# Patient Record
Sex: FEMALE | Race: BLACK OR AFRICAN AMERICAN | NOT HISPANIC OR LATINO | Employment: UNEMPLOYED | URBAN - METROPOLITAN AREA
[De-identification: names, ages, dates, MRNs, and addresses within clinical notes are randomized per-mention and may not be internally consistent; named-entity substitution may affect disease eponyms.]

---

## 2022-12-30 ENCOUNTER — OFFICE VISIT (OUTPATIENT)
Dept: OBSTETRICS AND GYNECOLOGY | Facility: CLINIC | Age: 22
End: 2022-12-30
Payer: MEDICAID

## 2022-12-30 VITALS
SYSTOLIC BLOOD PRESSURE: 127 MMHG | HEART RATE: 86 BPM | DIASTOLIC BLOOD PRESSURE: 58 MMHG | HEIGHT: 62 IN | BODY MASS INDEX: 26.87 KG/M2 | WEIGHT: 146 LBS

## 2022-12-30 DIAGNOSIS — Z64.1 GRAND MULTIPARA: ICD-10-CM

## 2022-12-30 DIAGNOSIS — Z32.01 POSITIVE PREGNANCY TEST: Primary | ICD-10-CM

## 2022-12-30 PROCEDURE — 1159F PR MEDICATION LIST DOCUMENTED IN MEDICAL RECORD: ICD-10-PCS | Mod: CPTII,S$GLB,, | Performed by: ADVANCED PRACTICE MIDWIFE

## 2022-12-30 PROCEDURE — 3078F PR MOST RECENT DIASTOLIC BLOOD PRESSURE < 80 MM HG: ICD-10-PCS | Mod: CPTII,S$GLB,, | Performed by: ADVANCED PRACTICE MIDWIFE

## 2022-12-30 PROCEDURE — 99203 PR OFFICE/OUTPT VISIT, NEW, LEVL III, 30-44 MIN: ICD-10-PCS | Mod: TH,S$GLB,, | Performed by: ADVANCED PRACTICE MIDWIFE

## 2022-12-30 PROCEDURE — 1159F MED LIST DOCD IN RCRD: CPT | Mod: CPTII,S$GLB,, | Performed by: ADVANCED PRACTICE MIDWIFE

## 2022-12-30 PROCEDURE — 3008F PR BODY MASS INDEX (BMI) DOCUMENTED: ICD-10-PCS | Mod: CPTII,S$GLB,, | Performed by: ADVANCED PRACTICE MIDWIFE

## 2022-12-30 PROCEDURE — 3074F PR MOST RECENT SYSTOLIC BLOOD PRESSURE < 130 MM HG: ICD-10-PCS | Mod: CPTII,S$GLB,, | Performed by: ADVANCED PRACTICE MIDWIFE

## 2022-12-30 PROCEDURE — 3074F SYST BP LT 130 MM HG: CPT | Mod: CPTII,S$GLB,, | Performed by: ADVANCED PRACTICE MIDWIFE

## 2022-12-30 PROCEDURE — 3008F BODY MASS INDEX DOCD: CPT | Mod: CPTII,S$GLB,, | Performed by: ADVANCED PRACTICE MIDWIFE

## 2022-12-30 PROCEDURE — 3078F DIAST BP <80 MM HG: CPT | Mod: CPTII,S$GLB,, | Performed by: ADVANCED PRACTICE MIDWIFE

## 2022-12-30 PROCEDURE — 99203 OFFICE O/P NEW LOW 30 MIN: CPT | Mod: TH,S$GLB,, | Performed by: ADVANCED PRACTICE MIDWIFE

## 2022-12-30 RX ORDER — CEFDINIR 300 MG/1
300 CAPSULE ORAL 2 TIMES DAILY
COMMUNITY
Start: 2022-12-18

## 2022-12-30 NOTE — PROGRESS NOTES
"CC: Absence of menses    Walter Ochoa is a 22 y.o. female  presents with complaint of absence of menstruation.   LMP of 22 = EDC of 23. Now 8 + 2/7 weeks. Here to establish OB care. Doing well.    UPT is positive. No lof/brvb, dysuria, fever/chills or abdominal pain.     OBHX:  Grand Multip   x 4 at term. Proven to # 7 lbs  PPROM PTL/PTD at ? 35 weeks with last pregnancy, delivery in [place] (2021).   Desires BTL    History reviewed. No pertinent past medical history.  History reviewed. No pertinent surgical history.  Social History     Socioeconomic History    Marital status: Single   Tobacco Use    Smoking status: Never     Passive exposure: Never    Smokeless tobacco: Never   Substance and Sexual Activity    Alcohol use: Not Currently    Drug use: Never    Sexual activity: Yes     Partners: Male     Birth control/protection: None     History reviewed. No pertinent family history.  OB History    Para Term  AB Living   6       0 5   SAB IAB Ectopic Multiple Live Births   0       5      # Outcome Date GA Lbr Deonte/2nd Weight Sex Delivery Anes PTL Lv   6 Current            5  21    M Vag-Spont None N CAROL   4  19    M Vag-Spont None N CAROL   3  18    M  EPI N CAROL   2  17    M Vag-Spont EPI N CAROL   1  12    M Vag-Spont EPI N CAROL       BP (!) 127/58   Pulse 86   Ht 5' 2" (1.575 m)   Wt 66.2 kg (146 lb)   LMP 2022 (Exact Date)   BMI 26.70 kg/m²     ROS:  GENERAL: Denies weight gain or weight loss. Feeling well overall.   SKIN: Denies rash or lesions.   HEAD: Denies head injury or headache.   NODES: Denies enlarged lymph nodes.   CHEST: Denies chest pain or shortness of breath.   CARDIOVASCULAR: Denies palpitations or left sided chest pain.   ABDOMEN: No abdominal pain, constipation, diarrhea, or rectal bleeding. Mild nausea.  URINARY: No frequency, dysuria, hematuria, or burning on " urination.  REPRODUCTIVE: See HPI.   BREASTS: The patient performs breast self-examination and denies pain, lumps, or nipple discharge.   HEMATOLOGIC: No easy bruisability or excessive bleeding.   MUSCULOSKELETAL: Denies joint pain or swelling.   NEUROLOGIC: Denies syncope or weakness.   PSYCHIATRIC: Denies depression, anxiety or mood swings.    PE:   APPEARANCE: Well nourished, well groomed, well developed, in no acute distress.  AFFECT: WNL, alert and oriented x 3.  SKIN: No acne or hirsutism. No lesion/rashes.  NEURO: Gait is smooth and steady.  EXTREMITIES: No edema.      ASSESSMENT and PLAN:    ICD-10-CM ICD-9-CM    1. Positive pregnancy test  Z32.01 V72.42 US OB/GYN Procedure ([facility])-Future      RPR      HIV 1/2 Ag/Ab (4th Gen)      C. trachomatis/N. gonorrhoeae by AMP DNA [facility]; Urine      Urine culture      CBC Auto Differential      Rubella Antibody, IgG      Type & Screen - Ob Profile      Prenatal Miscellaneous Test, Blood      Hepatitis Panel, Acute      Sickle cell screen      Drug screen panel, in-house      CANCELED: US OB/GYN Procedure ([facility])-Future      2. Grand multipara  Z64.1 V61.5           [place] to our collaborative OB/GYN practice and CNM care.  S&S of SAB reinforced.  Daily OTC PNV.  Patient was counseled today on proper weight gain.  Discussed foods to avoid in pregnancy (i.e. sushi, fish that are high in mercury, deli meat, and unpasteurized cheeses).   OB US dating discussed and ordered for next week.  Comfort measures for mild N&V discussed.  MY CHART portal for lab results and communication discussed.  KAMLESH in 2 weeks with all new ob labs including Panorama/discussed and desires.

## 2023-01-13 DIAGNOSIS — Z32.01 POSITIVE PREGNANCY TEST: Primary | ICD-10-CM

## 2023-01-20 ENCOUNTER — ROUTINE PRENATAL (OUTPATIENT)
Dept: OBSTETRICS AND GYNECOLOGY | Facility: CLINIC | Age: 23
End: 2023-01-20
Payer: MEDICAID

## 2023-01-20 ENCOUNTER — PROCEDURE VISIT (OUTPATIENT)
Dept: MATERNAL FETAL MEDICINE | Facility: CLINIC | Age: 23
End: 2023-01-20
Payer: MEDICAID

## 2023-01-20 VITALS
HEART RATE: 73 BPM | DIASTOLIC BLOOD PRESSURE: 94 MMHG | WEIGHT: 140.63 LBS | BODY MASS INDEX: 25.72 KG/M2 | SYSTOLIC BLOOD PRESSURE: 125 MMHG

## 2023-01-20 DIAGNOSIS — O21.9 NAUSEA AND VOMITING DURING PREGNANCY: ICD-10-CM

## 2023-01-20 DIAGNOSIS — Z32.01 POSITIVE PREGNANCY TEST: ICD-10-CM

## 2023-01-20 DIAGNOSIS — O09.899 HISTORY OF PRETERM DELIVERY, CURRENTLY PREGNANT: ICD-10-CM

## 2023-01-20 DIAGNOSIS — Z3A.09 9 WEEKS GESTATION OF PREGNANCY: Primary | ICD-10-CM

## 2023-01-20 LAB
AMPHET+METHAMPHET UR QL: NEGATIVE
BARBITURATES UR QL SCN>200 NG/ML: NEGATIVE
BENZODIAZ UR QL SCN>200 NG/ML: NEGATIVE
BZE UR QL SCN: NEGATIVE
CANNABINOIDS UR QL SCN: NEGATIVE
CREAT UR-MCNC: 68.8 MG/DL (ref 15–325)
METHADONE UR QL SCN>300 NG/ML: NEGATIVE
OPIATES UR QL SCN: NEGATIVE
PCP UR QL SCN>25 NG/ML: NEGATIVE
TOXICOLOGY INFORMATION: NORMAL

## 2023-01-20 PROCEDURE — 87086 URINE CULTURE/COLONY COUNT: CPT | Performed by: ADVANCED PRACTICE MIDWIFE

## 2023-01-20 PROCEDURE — 80074 ACUTE HEPATITIS PANEL: CPT | Performed by: ADVANCED PRACTICE MIDWIFE

## 2023-01-20 PROCEDURE — 76801 OB US < 14 WKS SINGLE FETUS: CPT | Mod: S$GLB,,, | Performed by: OBSTETRICS & GYNECOLOGY

## 2023-01-20 PROCEDURE — 86762 RUBELLA ANTIBODY: CPT | Performed by: ADVANCED PRACTICE MIDWIFE

## 2023-01-20 PROCEDURE — 80307 DRUG TEST PRSMV CHEM ANLYZR: CPT | Performed by: ADVANCED PRACTICE MIDWIFE

## 2023-01-20 PROCEDURE — 87591 N.GONORRHOEAE DNA AMP PROB: CPT | Performed by: ADVANCED PRACTICE MIDWIFE

## 2023-01-20 PROCEDURE — 87389 HIV-1 AG W/HIV-1&-2 AB AG IA: CPT | Performed by: ADVANCED PRACTICE MIDWIFE

## 2023-01-20 PROCEDURE — 99214 PR OFFICE/OUTPT VISIT, EST, LEVL IV, 30-39 MIN: ICD-10-PCS | Mod: TH,S$GLB,, | Performed by: ADVANCED PRACTICE MIDWIFE

## 2023-01-20 PROCEDURE — 99214 OFFICE O/P EST MOD 30 MIN: CPT | Mod: TH,S$GLB,, | Performed by: ADVANCED PRACTICE MIDWIFE

## 2023-01-20 PROCEDURE — 86592 SYPHILIS TEST NON-TREP QUAL: CPT | Performed by: ADVANCED PRACTICE MIDWIFE

## 2023-01-20 PROCEDURE — 85660 RBC SICKLE CELL TEST: CPT | Performed by: ADVANCED PRACTICE MIDWIFE

## 2023-01-20 PROCEDURE — 76801 US OB/GYN PROCEDURE (VIEWPOINT): ICD-10-PCS | Mod: S$GLB,,, | Performed by: OBSTETRICS & GYNECOLOGY

## 2023-01-20 RX ORDER — ONDANSETRON 4 MG/1
4 TABLET, FILM COATED ORAL EVERY 6 HOURS PRN
Qty: 30 TABLET | Refills: 1 | Status: SHIPPED | OUTPATIENT
Start: 2023-01-20 | End: 2023-04-27

## 2023-01-20 RX ORDER — PROMETHAZINE HYDROCHLORIDE 25 MG/1
25 TABLET ORAL EVERY 8 HOURS PRN
Qty: 30 TABLET | Refills: 1 | Status: SHIPPED | OUTPATIENT
Start: 2023-01-20 | End: 2023-04-27

## 2023-01-20 NOTE — PROGRESS NOTES
2023  22 y.o.  at 9w5d Estimated Date of Delivery: 23. EDC per US in [facility] office at 9+ 5/7 weeks.  OB History    Para Term  AB Living   6       0 5   SAB IAB Ectopic Multiple Live Births   0       5      # Outcome Date GA Lbr Deonte/2nd Weight Sex Delivery Anes PTL Lv   6 Current            5  21    M Vag-Spont None N CAROL   4  19    M Vag-Spont None N CAROL   3  18    M  EPI N CAROL   2  17    M Vag-Spont EPI N CAROL   1  12    M Vag-Spont EPI N CAROL       Here for scheduled KAMLESH visit. Doing well.  No lof/brvb, dysuria, fever/chills, or abdominal pain. Calm, pleasant, NAD. ROS negative with exception of aforementioned:  Moderate N&V not relieved with comfort measures.    Review of Systems:  General ROS: negative for headache or visual changes  Breast ROS: negative for breast lumps  Gastrointestinal ROS: negative for constipation, diarrhea or nausea/vomiting  Musculoskeletal ROS: negative for pain in joints or swelling in face or hands.   Neurological ROS: negative for - headaches, numbness/tingling or visual changes    OBHX:  Grand Multip   x 4 at term. Proven to # 7 lbs  PPROM PTL/PTD at ? 35 weeks with last pregnancy, delivery in [place] (2021).   Desires BTL      Physical Exam:  BP (!) 125/94   Pulse 73   Wt 63.8 kg (140 lb 9.6 oz)   LMP 2022 (Exact Date)   BMI 25.72 kg/m²   FHT:  US today, 9+ 5/7 weeks, + FCA.     Constitutional: She is oriented to person, place, and time. She appears well-developed and well-nourished. No distress.   Pulmonary/Chest: Effort normal. No respiratory distress  Abdominal: Soft, gravid, nontender. No rebound and no guarding.  Genitourinary: Deferred   Musculoskeletal: Normal range of motion. Minimal peripheral edema.   Neurological: She is alert and oriented to person, place, and time. Coordination normal. Gait smooth and steady  Skin: Skin is warm and dry. She is not  diaphoretic.  Psychiatric: She has a normal mood and affect.      Assessment:   22 y.o., at 9w5d Gestation   Patient Active Problem List   Diagnosis    Grand multipara    Positive pregnancy test    History of  delivery, currently pregnant    9 weeks gestation of pregnancy    Nausea and vomiting during pregnancy     Current Outpatient Medications on File Prior to Visit   Medication Sig Dispense Refill    cefdinir (OMNICEF) 300 MG capsule Take 300 mg by mouth 2 (two) times daily.       No current facility-administered medications on file prior to visit.       Plan:  1. S&S of SAB reinforced.  2. Continue home meds/daily PNV. RX for Zofran and Phenergan to pharmacy/discussed.  3. Discussed EDC change due to US today at 9+5/7 weeks. EDC = 23.   4. All new ob labs today including Panorama/desires.  5. KAMLESH 3 weeks or sooner, prn.

## 2023-01-21 LAB
BACTERIA UR CULT: NORMAL
BACTERIA UR CULT: NORMAL

## 2023-01-23 DIAGNOSIS — Z36.89 ENCOUNTER FOR FETAL ANATOMIC SURVEY: Primary | ICD-10-CM

## 2023-01-24 LAB
C TRACH DNA SPEC QL NAA+PROBE: NOT DETECTED
N GONORRHOEA DNA SPEC QL NAA+PROBE: NOT DETECTED

## 2023-02-10 ENCOUNTER — ROUTINE PRENATAL (OUTPATIENT)
Dept: OBSTETRICS AND GYNECOLOGY | Facility: CLINIC | Age: 23
End: 2023-02-10
Payer: MEDICAID

## 2023-02-10 VITALS
BODY MASS INDEX: 27.98 KG/M2 | DIASTOLIC BLOOD PRESSURE: 70 MMHG | WEIGHT: 153 LBS | SYSTOLIC BLOOD PRESSURE: 110 MMHG | HEART RATE: 88 BPM

## 2023-02-10 DIAGNOSIS — Z3A.12 12 WEEKS GESTATION OF PREGNANCY: Primary | ICD-10-CM

## 2023-02-10 DIAGNOSIS — Z64.1 GRAND MULTIPARA: ICD-10-CM

## 2023-02-10 DIAGNOSIS — O09.899 HISTORY OF PRETERM DELIVERY, CURRENTLY PREGNANT: ICD-10-CM

## 2023-02-10 DIAGNOSIS — O21.9 NAUSEA AND VOMITING DURING PREGNANCY: ICD-10-CM

## 2023-02-10 PROBLEM — Z32.01 POSITIVE PREGNANCY TEST: Status: RESOLVED | Noted: 2022-12-30 | Resolved: 2023-02-10

## 2023-02-10 PROBLEM — Z3A.09 9 WEEKS GESTATION OF PREGNANCY: Status: RESOLVED | Noted: 2023-01-20 | Resolved: 2023-02-10

## 2023-02-10 PROCEDURE — 99213 PR OFFICE/OUTPT VISIT, EST, LEVL III, 20-29 MIN: ICD-10-PCS | Mod: TH,S$GLB,,

## 2023-02-10 PROCEDURE — 99213 OFFICE O/P EST LOW 20 MIN: CPT | Mod: TH,S$GLB,,

## 2023-02-10 NOTE — PROGRESS NOTES
2/10/2023  22 y.o.  at 12w5d Estimated Date of Delivery: 23. EDC per US in [facility] office at 9+ 5/7 weeks.  OB History    Para Term  AB Living   6 5 4 1 0 5   SAB IAB Ectopic Multiple Live Births   0       5      # Outcome Date GA Lbr Deonte/2nd Weight Sex Delivery Anes PTL Lv   6 Current            5  21    M Vag-Spont None N CAROL   4 Term 19    M Vag-Spont None N CAROL   3 Term 18    M Vag-Spont EPI N CAROL   2 Term 17    M Vag-Spont EPI N CAROL   1 Term 12    M Vag-Spont EPI N CAROL       Here for scheduled KAMLESH visit. Doing well.  No lof/brvb, dysuria, fever/chills, or abdominal pain. Calm, pleasant, NAD. ROS negative with exception of aforementioned:  Moderate N&V relieved with zofran and phenergan.    Hx of PTD at 35 weeks. We discussed recommendation for MFM referral and cervical length u/s beginning at 16 weeks. Pt agreeable to plan of care. Janie injections offered beginning at 16 weeks, pt states she will think about it and let us know at next visit. Orders for referral and u/s placed.     LABS:  A POS/ ABS neg  CBC 13.2/38.8/278  Rubella Immune  Sickle Cell Neg  Hep A B C Neg  HIV Neg  RPR Non-Reactive  UDS Neg  GC/CHL Neg  Urine Cx No Growth  NIPT Low Risk x 3; Male  AFP Next visit    Review of Systems:  General ROS: negative for headache or visual changes  Breast ROS: negative for breast lumps  Gastrointestinal ROS: negative for constipation, diarrhea or nausea/vomiting  Musculoskeletal ROS: negative for pain in joints or swelling in face or hands.   Neurological ROS: negative for - headaches, numbness/tingling or visual changes    OBHX:  Grand Multip   x 4 at term. Proven to # 7 lbs  PPROM PTL/PTD at ? 35 weeks with last pregnancy, delivery in [place] (2021).   Desires BTL    Physical Exam:  /70   Pulse 88   Wt 69.4 kg (153 lb)   LMP 2022 (Exact Date)   BMI 27.98 kg/m²   FHT: Fetal Heart Rate: 163     Constitutional: She is oriented  to person, place, and time. She appears well-developed and well-nourished. No distress.   Pulmonary/Chest: Effort normal. No respiratory distress  Abdominal: Soft, gravid, nontender. No rebound and no guarding.  Genitourinary: Deferred   Musculoskeletal: Normal range of motion. Minimal peripheral edema.   Neurological: She is alert and oriented to person, place, and time. Coordination normal. Gait smooth and steady  Skin: Skin is warm and dry. She is not diaphoretic.  Psychiatric: She has a normal mood and affect.      Assessment:   22 y.o., at 12w5d Gestation   Patient Active Problem List   Diagnosis    Grand multipara    Positive pregnancy test    History of  delivery, currently pregnant    9 weeks gestation of pregnancy    Nausea and vomiting during pregnancy    12 weeks gestation of pregnancy     Current Outpatient Medications on File Prior to Visit   Medication Sig Dispense Refill    cefdinir (OMNICEF) 300 MG capsule Take 300 mg by mouth 2 (two) times daily.      ondansetron (ZOFRAN) 4 MG tablet Take 1 tablet (4 mg total) by mouth every 6 (six) hours as needed for Nausea. 30 tablet 1    promethazine (PHENERGAN) 25 MG tablet Take 1 tablet (25 mg total) by mouth every 8 (eight) hours as needed for Nausea. 30 tablet 1     No current facility-administered medications on file prior to visit.       Plan:  1. S&S of SAB reinforced.  2. Continue home meds/daily PNV. RX for Zofran and Phenergan to pharmacy/discussed.  3. Discussed EDC change due to US today at 9+5/7 weeks. EDC = 23.   4. Hx of PTL/PTD at 35 weeks. Referral to [facility]. Cervical length u/s ordered. Pt will decide if she wants Janie at next visit.   5. NIPT Low Risk x 3.  6. AFP next visit.  7. KAMLESH 4 weeks.

## 2023-02-20 ENCOUNTER — PATIENT MESSAGE (OUTPATIENT)
Dept: MATERNAL FETAL MEDICINE | Facility: CLINIC | Age: 23
End: 2023-02-20
Payer: MEDICAID

## 2023-02-20 ENCOUNTER — TELEPHONE (OUTPATIENT)
Dept: MATERNAL FETAL MEDICINE | Facility: CLINIC | Age: 23
End: 2023-02-20
Payer: MEDICAID

## 2023-02-20 NOTE — TELEPHONE ENCOUNTER
Phone number listed is not accepting calls at this time.   Will try again later to attempt to set up appt with M.

## 2023-02-27 ENCOUNTER — TELEPHONE (OUTPATIENT)
Dept: MATERNAL FETAL MEDICINE | Facility: CLINIC | Age: 23
End: 2023-02-27
Payer: MEDICAID

## 2023-02-27 NOTE — TELEPHONE ENCOUNTER
X 4 attempt made by MFM to set up appt.   No voicemail option. Phone number listed not accepting calls at this time.  Will notify OB provider unable to contact patient.

## 2023-03-05 ENCOUNTER — PATIENT MESSAGE (OUTPATIENT)
Dept: OTHER | Facility: OTHER | Age: 23
End: 2023-03-05
Payer: MEDICAID

## 2023-03-12 ENCOUNTER — PATIENT MESSAGE (OUTPATIENT)
Dept: OTHER | Facility: OTHER | Age: 23
End: 2023-03-12
Payer: MEDICAID

## 2023-03-29 ENCOUNTER — ROUTINE PRENATAL (OUTPATIENT)
Dept: OBSTETRICS AND GYNECOLOGY | Facility: CLINIC | Age: 23
End: 2023-03-29
Payer: MEDICAID

## 2023-03-29 VITALS — WEIGHT: 156 LBS | SYSTOLIC BLOOD PRESSURE: 121 MMHG | BODY MASS INDEX: 28.53 KG/M2 | DIASTOLIC BLOOD PRESSURE: 73 MMHG

## 2023-03-29 DIAGNOSIS — Z3A.19 19 WEEKS GESTATION OF PREGNANCY: Primary | ICD-10-CM

## 2023-03-29 DIAGNOSIS — O21.9 NAUSEA AND VOMITING DURING PREGNANCY: ICD-10-CM

## 2023-03-29 DIAGNOSIS — O09.899 HISTORY OF PRETERM DELIVERY, CURRENTLY PREGNANT: ICD-10-CM

## 2023-03-29 DIAGNOSIS — B37.9 YEAST INFECTION: ICD-10-CM

## 2023-03-29 DIAGNOSIS — Z64.1 GRAND MULTIPARA: ICD-10-CM

## 2023-03-29 DIAGNOSIS — R30.0 DYSURIA: ICD-10-CM

## 2023-03-29 PROBLEM — Z3A.12 12 WEEKS GESTATION OF PREGNANCY: Status: RESOLVED | Noted: 2023-02-10 | Resolved: 2023-03-29

## 2023-03-29 PROCEDURE — 99213 PR OFFICE/OUTPT VISIT, EST, LEVL III, 20-29 MIN: ICD-10-PCS | Mod: TH,S$GLB,,

## 2023-03-29 PROCEDURE — 87591 N.GONORRHOEAE DNA AMP PROB: CPT

## 2023-03-29 PROCEDURE — 87086 URINE CULTURE/COLONY COUNT: CPT

## 2023-03-29 PROCEDURE — 99213 OFFICE O/P EST LOW 20 MIN: CPT | Mod: TH,S$GLB,,

## 2023-03-29 RX ORDER — FLUCONAZOLE 200 MG/1
200 TABLET ORAL DAILY
Qty: 3 TABLET | Refills: 0 | Status: SHIPPED | OUTPATIENT
Start: 2023-03-29 | End: 2023-04-01

## 2023-03-29 NOTE — PROGRESS NOTES
3/29/2023  22 y.o.  at 19w3d Estimated Date of Delivery: 23. EDC per US in [facility] office at 9+ 5/7 weeks.  OB History    Para Term  AB Living   6 5 4 1 0 5   SAB IAB Ectopic Multiple Live Births   0 0 0   5      # Outcome Date GA Lbr Deonte/2nd Weight Sex Delivery Anes PTL Lv   6 Current            5  21 29w6d  1.655 kg (3 lb 10.4 oz) M Vag-Spont None N CAROL      Complications:  premature rupture of membranes (PPROM) with unknown onset of labor   4 Term 19    M Vag-Spont None N CAROL   3 Term 18    M Vag-Spont EPI N CAROL   2 Term 17    M Vag-Spont EPI N CAROL   1 Term 12    M Vag-Spont EPI N CAROL       Here for scheduled KAMLESH visit. Doing well.  No lof/brvb, dysuria, fever/chills, or abdominal pain. Endorses +FM. Calm, pleasant, NAD. ROS negative with exception of aforementioned:   N&V relieved with zofran and phenergan.    Hx of PTD at 35 weeks. We discussed recommendation for MFM referral and cervical length u/s beginning at 16 weeks. Pt agreeable to plan of care. Orders for referral and u/s placed. MFM to schedule patient.     Patient has not been seen in 2 months. Importance of keeping regular PNC appointments reviewed and discussed.     LABS:  A POS/ ABS neg  CBC 13.2/38.8/278  Rubella Immune  Sickle Cell Neg  Hep A B C Neg  HIV Neg  RPR Non-Reactive  UDS Neg  GC/CHL Neg  Urine Cx No Growth  NIPT Low Risk x 3; Male  AFP today    Review of Systems:  General ROS: negative for headache or visual changes  Breast ROS: negative for breast lumps  Gastrointestinal ROS: negative for constipation, diarrhea or nausea/vomiting  Musculoskeletal ROS: negative for pain in joints or swelling in face or hands.   Neurological ROS: negative for - headaches, numbness/tingling or visual changes    OBHX:  Grand Multip   x 4 at term. Proven to # 7 lbs  PPROM PTL/PTD at ? 35 weeks with last pregnancy, delivery in [place] (2021).   Desires BTL    Physical Exam:  /73    Wt 70.8 kg (156 lb)   LMP 2022 (Exact Date)   BMI 28.53 kg/m²   FHT: Fetal Heart Rate: 150s     Constitutional: She is oriented to person, place, and time. She appears well-developed and well-nourished. No distress.   Pulmonary/Chest: Effort normal. No respiratory distress  Abdominal: Soft, gravid, nontender. No rebound and no guarding.  Fundal Height: Fundal Height (cm): 19 cm S=D  Genitourinary: Deferred   Musculoskeletal: Normal range of motion. Minimal peripheral edema.   Neurological: She is alert and oriented to person, place, and time. Coordination normal. Gait smooth and steady  Skin: Skin is warm and dry. She is not diaphoretic.  Psychiatric: She has a normal mood and affect.      Assessment:   22 y.o., at 19w3d Gestation   Patient Active Problem List   Diagnosis    Grand multipara    History of  delivery, currently pregnant    Nausea and vomiting during pregnancy    19 weeks gestation of pregnancy     Current Outpatient Medications on File Prior to Visit   Medication Sig Dispense Refill    cefdinir (OMNICEF) 300 MG capsule Take 300 mg by mouth 2 (two) times daily.      ondansetron (ZOFRAN) 4 MG tablet Take 1 tablet (4 mg total) by mouth every 6 (six) hours as needed for Nausea. 30 tablet 1    promethazine (PHENERGAN) 25 MG tablet Take 1 tablet (25 mg total) by mouth every 8 (eight) hours as needed for Nausea. 30 tablet 1     No current facility-administered medications on file prior to visit.       Plan:  S&S of SAB, PTL/PPROM reinforced.  Continue home meds/daily PNV. RX for Zofran and Phenergan to pharmacy/discussed.  Discussed EDC change due to US today at 9+5/7 weeks. EDC = 23.   Hx of PTL/PTD at 35 weeks. Referral to [facility]. Cervical length u/s ordered. [facility] to schedule.   NIPT Low Risk x 3; Male.  AFP today.  Anatomy u/s scheduled for 2023.  KAMLESH 4 weeks.

## 2023-03-30 ENCOUNTER — PATIENT MESSAGE (OUTPATIENT)
Dept: OBSTETRICS AND GYNECOLOGY | Facility: CLINIC | Age: 23
End: 2023-03-30
Payer: MEDICAID

## 2023-03-30 DIAGNOSIS — Z36.89 ENCOUNTER FOR FETAL ANATOMIC SURVEY: ICD-10-CM

## 2023-03-30 DIAGNOSIS — O21.9 NAUSEA AND VOMITING DURING PREGNANCY: Primary | ICD-10-CM

## 2023-03-30 DIAGNOSIS — O09.899 HISTORY OF PRETERM DELIVERY, CURRENTLY PREGNANT: ICD-10-CM

## 2023-04-01 LAB
BACTERIA UR CULT: NORMAL
BACTERIA UR CULT: NORMAL

## 2023-04-02 ENCOUNTER — PATIENT MESSAGE (OUTPATIENT)
Dept: OTHER | Facility: OTHER | Age: 23
End: 2023-04-02
Payer: MEDICAID

## 2023-04-03 ENCOUNTER — TELEPHONE (OUTPATIENT)
Dept: OBSTETRICS AND GYNECOLOGY | Facility: CLINIC | Age: 23
End: 2023-04-03
Payer: MEDICAID

## 2023-04-03 DIAGNOSIS — B37.9 YEAST INFECTION: Primary | ICD-10-CM

## 2023-04-03 RX ORDER — TERCONAZOLE 4 MG/G
1 CREAM VAGINAL NIGHTLY
Qty: 7 EACH | Refills: 0 | Status: SHIPPED | OUTPATIENT
Start: 2023-04-03 | End: 2023-04-10

## 2023-04-03 NOTE — TELEPHONE ENCOUNTER
Patient reports yeast infection still present and that Diflucan did not work. We discussed daily terconazole x 7 days. Reviewed this is a vaginal medication. Pt verbalizes understanding and desires to proceed with terconazole. Rx sent to pharmacy on file.

## 2023-04-03 NOTE — TELEPHONE ENCOUNTER
----- Message from Amanda Akbar LPN sent at 3/30/2023  4:29 PM CDT -----  Contact: patient    ----- Message -----  From: Angeline Maldonado  Sent: 3/30/2023  12:37 PM CDT  To: Ag Terry Staff    Type:  Test Results    Who Called:  patient  Name of Test (Lab/Mammo/Etc):  labs  Date of Test:  yesterday  Ordering Provider:  ag  Where the test was performed:    Best Call Back Number:  [phone] (home)   Additional Information:

## 2023-04-04 ENCOUNTER — PATIENT MESSAGE (OUTPATIENT)
Dept: MATERNAL FETAL MEDICINE | Facility: CLINIC | Age: 23
End: 2023-04-04
Payer: MEDICAID

## 2023-04-04 LAB
C TRACH DNA SPEC QL NAA+PROBE: NOT DETECTED
N GONORRHOEA DNA SPEC QL NAA+PROBE: NOT DETECTED

## 2023-04-05 ENCOUNTER — OFFICE VISIT (OUTPATIENT)
Dept: MATERNAL FETAL MEDICINE | Facility: CLINIC | Age: 23
End: 2023-04-05
Payer: MEDICAID

## 2023-04-05 ENCOUNTER — PROCEDURE VISIT (OUTPATIENT)
Dept: MATERNAL FETAL MEDICINE | Facility: CLINIC | Age: 23
End: 2023-04-05
Payer: MEDICAID

## 2023-04-05 VITALS
SYSTOLIC BLOOD PRESSURE: 129 MMHG | BODY MASS INDEX: 33.65 KG/M2 | WEIGHT: 182.88 LBS | HEIGHT: 62 IN | DIASTOLIC BLOOD PRESSURE: 76 MMHG

## 2023-04-05 DIAGNOSIS — Z36.89 ENCOUNTER FOR ULTRASOUND TO ASSESS FETAL GROWTH: Primary | ICD-10-CM

## 2023-04-05 DIAGNOSIS — Z36.89 ENCOUNTER FOR FETAL ANATOMIC SURVEY: ICD-10-CM

## 2023-04-05 DIAGNOSIS — O09.899 HISTORY OF PRETERM DELIVERY, CURRENTLY PREGNANT: ICD-10-CM

## 2023-04-05 PROCEDURE — 3008F PR BODY MASS INDEX (BMI) DOCUMENTED: ICD-10-PCS | Mod: CPTII,S$GLB,, | Performed by: OBSTETRICS & GYNECOLOGY

## 2023-04-05 PROCEDURE — 3078F PR MOST RECENT DIASTOLIC BLOOD PRESSURE < 80 MM HG: ICD-10-PCS | Mod: CPTII,S$GLB,, | Performed by: OBSTETRICS & GYNECOLOGY

## 2023-04-05 PROCEDURE — 1160F RVW MEDS BY RX/DR IN RCRD: CPT | Mod: CPTII,S$GLB,, | Performed by: OBSTETRICS & GYNECOLOGY

## 2023-04-05 PROCEDURE — 1159F MED LIST DOCD IN RCRD: CPT | Mod: CPTII,S$GLB,, | Performed by: OBSTETRICS & GYNECOLOGY

## 2023-04-05 PROCEDURE — 99204 OFFICE O/P NEW MOD 45 MIN: CPT | Mod: 25,TH,S$GLB, | Performed by: OBSTETRICS & GYNECOLOGY

## 2023-04-05 PROCEDURE — 1160F PR REVIEW ALL MEDS BY PRESCRIBER/CLIN PHARMACIST DOCUMENTED: ICD-10-PCS | Mod: CPTII,S$GLB,, | Performed by: OBSTETRICS & GYNECOLOGY

## 2023-04-05 PROCEDURE — 76805 PR US, OB 14+WKS, TRANSABD, SINGLE GESTATION: ICD-10-PCS | Mod: S$GLB,,, | Performed by: OBSTETRICS & GYNECOLOGY

## 2023-04-05 PROCEDURE — 99204 PR OFFICE/OUTPT VISIT, NEW, LEVL IV, 45-59 MIN: ICD-10-PCS | Mod: 25,TH,S$GLB, | Performed by: OBSTETRICS & GYNECOLOGY

## 2023-04-05 PROCEDURE — 3008F BODY MASS INDEX DOCD: CPT | Mod: CPTII,S$GLB,, | Performed by: OBSTETRICS & GYNECOLOGY

## 2023-04-05 PROCEDURE — 1159F PR MEDICATION LIST DOCUMENTED IN MEDICAL RECORD: ICD-10-PCS | Mod: CPTII,S$GLB,, | Performed by: OBSTETRICS & GYNECOLOGY

## 2023-04-05 PROCEDURE — 3074F SYST BP LT 130 MM HG: CPT | Mod: CPTII,S$GLB,, | Performed by: OBSTETRICS & GYNECOLOGY

## 2023-04-05 PROCEDURE — 76805 OB US >/= 14 WKS SNGL FETUS: CPT | Mod: S$GLB,,, | Performed by: OBSTETRICS & GYNECOLOGY

## 2023-04-05 PROCEDURE — 3074F PR MOST RECENT SYSTOLIC BLOOD PRESSURE < 130 MM HG: ICD-10-PCS | Mod: CPTII,S$GLB,, | Performed by: OBSTETRICS & GYNECOLOGY

## 2023-04-05 PROCEDURE — 3078F DIAST BP <80 MM HG: CPT | Mod: CPTII,S$GLB,, | Performed by: OBSTETRICS & GYNECOLOGY

## 2023-04-05 RX ORDER — PROGESTERONE 200 MG/1
200 CAPSULE ORAL NIGHTLY
Qty: 30 CAPSULE | Refills: 11 | Status: SHIPPED | OUTPATIENT
Start: 2023-04-05 | End: 2023-05-26 | Stop reason: SDUPTHER

## 2023-04-05 NOTE — PROGRESS NOTES
Chief complaint: History of PPROM with 29 week delivery    Provider requesting consultation: LYNDA Luong CN    22 y.o. [id number] 20w3d.    PMH:  Past Medical History:   Diagnosis Date    History of  delivery     29w       PObHx:  OB History    Para Term  AB Living   6 5 4 1 0 5   SAB IAB Ectopic Multiple Live Births   0 0 0   5      # Outcome Date GA Lbr Deonte/2nd Weight Sex Delivery Anes PTL Lv   6 Current            5  21 29w6d  1.655 kg (3 lb 10.4 oz) M Vag-Spont None N CAROL      Complications:  premature rupture of membranes (PPROM) with unknown onset of labor   4 Term 19    M Vag-Spont None N CAROL   3 Term 18    M Vag-Spont EPI N CAROL   2 Term 17    M Vag-Spont EPI N CAROL   1 Term 12    M Vag-Spont EPI N CAROL       PSH:History reviewed. No pertinent surgical history.    Family history:family history is not on file.    Social history: reports that she has never smoked. She has never been exposed to tobacco smoke. She has never used smokeless tobacco. She reports that she does not currently use alcohol. She reports that she does not use drugs.    A detailed fetal anatomical ultrasound was completed today.  See details in imaging section of EPIC.      Prior spontaneous  birth counseling and recommendations:    Today I discussed with the patient her prior history of  delivery.  I reviewed with her the increased risk of recurrence of  delivery in women who have a history of a prior spontaneous  birth.  I discussed current ACOG guidelines that recommend in patients with a prior history of spontaneous  birth they be offered progesterone therapy for the prevention of recurrent  birth.  This is usually accomplished via weekly 250mg intramuscular injections of 17-alpha-hydroxyprogesterone caproate beginning at 16-20 weeks estimated gestational age and continuing until 37 weeks.  However, since this is now taken off the  market I would recommend vaginal progesterone therapy 200 mg nightly. This was ordered today.  I also discussed with the patient that in women at risk for recurrent  birth, surveillance for cervical length shortening with periodic cervical length measurements may help identify those in need for tocolytic therapy and administration of corticosteroids.  Here at [facility] our [facility] group recommends to measure the cervix every 2-3 weeks transvaginally starting at 16-18 weeks gestation up until 24 weeks gestation.     Recommendations from our [facility] group at [facility]:    -Weekly progesterone injection therapy be initiated by her primary obstetrician at 16-20 weeks gestation until 37 weeks gestation    -Cervical length surveillance every 2-3 weeks starting at 16-18 weeks gestation up until 24 weeks gestation    With your permission we have taken the liberty today to schedule a follow up appointment for cervical length surveillance.        The patient was given an opportunity to ask questions about management and the diease process.  She expressed an understanding of and agreement to the above impression and plan. All questions were answered to her satisfaction.  She was given contact information to the [facility] clinic to address further concerns.

## 2023-04-26 ENCOUNTER — TELEPHONE (OUTPATIENT)
Dept: OBSTETRICS AND GYNECOLOGY | Facility: CLINIC | Age: 23
End: 2023-04-26

## 2023-04-27 ENCOUNTER — ROUTINE PRENATAL (OUTPATIENT)
Dept: OBSTETRICS AND GYNECOLOGY | Facility: CLINIC | Age: 23
End: 2023-04-27
Payer: MEDICAID

## 2023-04-27 VITALS — SYSTOLIC BLOOD PRESSURE: 134 MMHG | WEIGHT: 146 LBS | BODY MASS INDEX: 26.7 KG/M2 | DIASTOLIC BLOOD PRESSURE: 65 MMHG

## 2023-04-27 DIAGNOSIS — R11.10 HYPEREMESIS: ICD-10-CM

## 2023-04-27 DIAGNOSIS — O21.9 NAUSEA AND VOMITING DURING PREGNANCY: ICD-10-CM

## 2023-04-27 DIAGNOSIS — Z3A.23 23 WEEKS GESTATION OF PREGNANCY: Primary | ICD-10-CM

## 2023-04-27 DIAGNOSIS — O09.899 HISTORY OF PRETERM DELIVERY, CURRENTLY PREGNANT: ICD-10-CM

## 2023-04-27 DIAGNOSIS — Z64.1 GRAND MULTIPARA: ICD-10-CM

## 2023-04-27 PROBLEM — Z3A.19 19 WEEKS GESTATION OF PREGNANCY: Status: RESOLVED | Noted: 2023-03-29 | Resolved: 2023-04-27

## 2023-04-27 PROCEDURE — 59425 ANTEPARTUM CARE ONLY: CPT | Mod: TH,S$GLB,,

## 2023-04-27 PROCEDURE — 59425 PR ANTEPARTUM CARE ONLY, 4-6 VISITS: ICD-10-PCS | Mod: TH,S$GLB,,

## 2023-04-27 RX ORDER — DOXYLAMINE SUCCINATE AND PYRIDOXINE HYDROCHLORIDE, DELAYED RELEASE TABLETS 10 MG/10 MG 10; 10 MG/1; MG/1
2 TABLET, DELAYED RELEASE ORAL 2 TIMES DAILY PRN
Qty: 60 TABLET | Refills: 11 | Status: SHIPPED | OUTPATIENT
Start: 2023-04-27 | End: 2023-07-13 | Stop reason: SDUPTHER

## 2023-04-27 NOTE — PROGRESS NOTES
2023  22 y.o.  at 23w4d Estimated Date of Delivery: 23. EDC per US in M office at 9+ 5/7 weeks.  OB History    Para Term  AB Living   6 5 4 1 0 5   SAB IAB Ectopic Multiple Live Births   0 0 0   5      # Outcome Date GA Lbr Deonte/2nd Weight Sex Delivery Anes PTL Lv   6 Current            5  21 29w6d  1.655 kg (3 lb 10.4 oz) M Vag-Spont None N CAROL      Complications:  premature rupture of membranes (PPROM) with unknown onset of labor   4 Term 19    M Vag-Spont None N CAROL   3 Term 18    M Vag-Spont EPI N CAROL   2 Term 17    M Vag-Spont EPI N CAROL   1 Term 12    M Vag-Spont EPI N CAROL       Here for scheduled KAMLESH visit. Doing well.  No lof/brvb, dysuria, fever/chills, or abdominal pain. Endorses +FM. Calm, pleasant, NAD. ROS negative with exception of aforementioned:   N&V relieved with zofran and phenergan.    Hx of PTD at 35 weeks. We discussed recommendation for MFM referral and cervical length u/s beginning at 16 weeks. Pt cancelled or no showed all CL ultrasound visits. At 19 weeks during anatomy u/s CL was 5.2 cm. She is on nightly vaginal progesterone 200 mg to prevent PTL. She has been consulted by [facility].      Normal anatomy u/s.     GTT and CBC next visit. Reviewed and discussed today.     LABS:  A POS/ ABS neg  CBC 13.2/38.8/278  Rubella Immune  Sickle Cell Neg  Hep A B C Neg  HIV Neg  RPR Non-Reactive  UDS Neg  GC/CHL Neg  Urine Cx No Growth  NIPT Low Risk x 3; Male  AFP Neg    Review of Systems:  General ROS: negative for headache or visual changes  Breast ROS: negative for breast lumps  Gastrointestinal ROS: negative for constipation, diarrhea or nausea/vomiting  Musculoskeletal ROS: negative for pain in joints or swelling in face or hands.   Neurological ROS: negative for - headaches, numbness/tingling or visual changes    OBHX:  Grand Multip   x 4 at term. Proven to # 7 lbs  PPROM PTL/PTD at ? 35 weeks with last pregnancy, [place]  in [place] (20[place]).   Desires BTL    Physical Exam:  /65   Wt 66.2 kg (146 lb)   LMP 2022 (Exact Date)   BMI 26.70 kg/m²   FHT: Fetal Heart Rate: 150s     Constitutional: She is oriented to person, place, and time. She appears well-developed and well-nourished. No distress.   Pulmonary/Chest: Effort normal. No respiratory distress  Abdominal: Soft, gravid, nontender. No rebound and no guarding.  Fundal Height: Fundal Height (cm): 23 cm S=D  Genitourinary: Deferred   Musculoskeletal: Normal range of motion. Minimal peripheral edema.   Neurological: She is alert and oriented to person, place, and time. Coordination normal. Gait smooth and steady  Skin: Skin is warm and dry. She is not diaphoretic.  Psychiatric: She has a normal mood and affect.      Assessment:   22 y.o., at 23w4d Gestation   Patient Active Problem List   Diagnosis    Grand multipara    History of  delivery, currently pregnant    Nausea and vomiting during pregnancy    23 weeks gestation of pregnancy     Current Outpatient Medications on File Prior to Visit   Medication Sig Dispense Refill    cefdinir (OMNICEF) 300 MG capsule Take 300 mg by mouth 2 (two) times daily.      [DISCONTINUED] ondansetron (ZOFRAN) 4 MG tablet Take 1 tablet (4 mg total) by mouth every 6 (six) hours as needed for Nausea. 30 tablet 1    [DISCONTINUED] promethazine (PHENERGAN) 25 MG tablet Take 1 tablet (25 mg total) by mouth every 8 (eight) hours as needed for Nausea. 30 tablet 1    progesterone (PROMETRIUM) 200 MG capsule Place 1 capsule (200 mg total) vaginally nightly. (Patient not taking: Reported on 2023) 30 capsule 11     No current facility-administered medications on file prior to visit.       Plan:  S&S of  PTL/PPROM reinforced.  Continue home meds/daily PNV. RX for Zofran and Phenergan to pharmacy/discussed.  Discussed EDC change due to US today at 9+5/7 weeks. EDC = 23.   Hx of PTL/PTD at 35 weeks. Consulted by [facility]. CL at 19 weeks  was 5.2 cm. Taking nightly prometrium.   NIPT Low Risk x 3; Male. Neg.  Normal fetal anatomy u/s.  GTT and CBC next visit.   KAMLESH 4 weeks.

## 2023-04-30 ENCOUNTER — PATIENT MESSAGE (OUTPATIENT)
Dept: OTHER | Facility: OTHER | Age: 23
End: 2023-04-30
Payer: MEDICAID

## 2023-05-14 ENCOUNTER — PATIENT MESSAGE (OUTPATIENT)
Dept: OTHER | Facility: OTHER | Age: 23
End: 2023-05-14
Payer: MEDICAID

## 2023-05-26 ENCOUNTER — ROUTINE PRENATAL (OUTPATIENT)
Dept: OBSTETRICS AND GYNECOLOGY | Facility: CLINIC | Age: 23
End: 2023-05-26
Payer: MEDICAID

## 2023-05-26 ENCOUNTER — LAB VISIT (OUTPATIENT)
Dept: LAB | Facility: CLINIC | Age: 23
End: 2023-05-26
Payer: MEDICAID

## 2023-05-26 VITALS
BODY MASS INDEX: 27.76 KG/M2 | WEIGHT: 151.81 LBS | DIASTOLIC BLOOD PRESSURE: 64 MMHG | HEART RATE: 85 BPM | SYSTOLIC BLOOD PRESSURE: 114 MMHG

## 2023-05-26 DIAGNOSIS — O21.9 NAUSEA AND VOMITING DURING PREGNANCY: ICD-10-CM

## 2023-05-26 DIAGNOSIS — Z3A.23 23 WEEKS GESTATION OF PREGNANCY: ICD-10-CM

## 2023-05-26 DIAGNOSIS — Z64.1 GRAND MULTIPARA: ICD-10-CM

## 2023-05-26 DIAGNOSIS — O09.899 HISTORY OF PRETERM DELIVERY, CURRENTLY PREGNANT: ICD-10-CM

## 2023-05-26 DIAGNOSIS — Z3A.27 27 WEEKS GESTATION OF PREGNANCY: Primary | ICD-10-CM

## 2023-05-26 LAB
BASOPHILS # BLD AUTO: 0.02 K/UL (ref 0–0.2)
BASOPHILS NFR BLD: 0.4 % (ref 0–1.9)
DIFFERENTIAL METHOD: ABNORMAL
EOSINOPHIL # BLD AUTO: 0.1 K/UL (ref 0–0.5)
EOSINOPHIL NFR BLD: 1.3 % (ref 0–8)
ERYTHROCYTE [DISTWIDTH] IN BLOOD BY AUTOMATED COUNT: 11.8 % (ref 11.5–14.5)
GLUCOSE SERPL-MCNC: 82 MG/DL (ref 70–140)
HCT VFR BLD AUTO: 35 % (ref 37–48.5)
HGB BLD-MCNC: 11.9 G/DL (ref 12–16)
IMM GRANULOCYTES # BLD AUTO: 0.01 K/UL (ref 0–0.04)
IMM GRANULOCYTES NFR BLD AUTO: 0.2 % (ref 0–0.5)
LYMPHOCYTES # BLD AUTO: 1.5 K/UL (ref 1–4.8)
LYMPHOCYTES NFR BLD: 27.5 % (ref 18–48)
MCH RBC QN AUTO: 29.6 PG (ref 27–31)
MCHC RBC AUTO-ENTMCNC: 34 G/DL (ref 32–36)
MCV RBC AUTO: 87 FL (ref 82–98)
MONOCYTES # BLD AUTO: 0.6 K/UL (ref 0.3–1)
MONOCYTES NFR BLD: 10.6 % (ref 4–15)
NEUTROPHILS # BLD AUTO: 3.2 K/UL (ref 1.8–7.7)
NEUTROPHILS NFR BLD: 60 % (ref 38–73)
NRBC BLD-RTO: 0 /100 WBC
PLATELET # BLD AUTO: 228 K/UL (ref 150–450)
PMV BLD AUTO: 11.6 FL (ref 9.2–12.9)
RBC # BLD AUTO: 4.02 M/UL (ref 4–5.4)
WBC # BLD AUTO: 5.3 K/UL (ref 3.9–12.7)

## 2023-05-26 PROCEDURE — 59425 PR ANTEPARTUM CARE ONLY, 4-6 VISITS: ICD-10-PCS | Mod: TH,S$GLB,,

## 2023-05-26 PROCEDURE — 82950 GLUCOSE TEST: CPT

## 2023-05-26 PROCEDURE — 36415 COLL VENOUS BLD VENIPUNCTURE: CPT | Mod: ,,, | Performed by: STUDENT IN AN ORGANIZED HEALTH CARE EDUCATION/TRAINING PROGRAM

## 2023-05-26 PROCEDURE — 85025 COMPLETE CBC W/AUTO DIFF WBC: CPT

## 2023-05-26 PROCEDURE — 36415 PR COLLECTION VENOUS BLOOD,VENIPUNCTURE: ICD-10-PCS | Mod: ,,, | Performed by: STUDENT IN AN ORGANIZED HEALTH CARE EDUCATION/TRAINING PROGRAM

## 2023-05-26 PROCEDURE — 59425 ANTEPARTUM CARE ONLY: CPT | Mod: TH,S$GLB,,

## 2023-05-26 RX ORDER — PROGESTERONE 200 MG/1
200 CAPSULE ORAL NIGHTLY
Qty: 30 CAPSULE | Refills: 5 | Status: SHIPPED | OUTPATIENT
Start: 2023-05-26 | End: 2023-11-22

## 2023-05-28 ENCOUNTER — PATIENT MESSAGE (OUTPATIENT)
Dept: OTHER | Facility: OTHER | Age: 23
End: 2023-05-28
Payer: MEDICAID

## 2023-06-11 ENCOUNTER — PATIENT MESSAGE (OUTPATIENT)
Dept: OTHER | Facility: OTHER | Age: 23
End: 2023-06-11
Payer: MEDICAID

## 2023-06-25 ENCOUNTER — PATIENT MESSAGE (OUTPATIENT)
Dept: OTHER | Facility: OTHER | Age: 23
End: 2023-06-25
Payer: MEDICAID

## 2023-06-27 ENCOUNTER — TELEPHONE (OUTPATIENT)
Dept: OBSTETRICS AND GYNECOLOGY | Facility: CLINIC | Age: 23
End: 2023-06-27
Payer: MEDICAID

## 2023-06-29 ENCOUNTER — ROUTINE PRENATAL (OUTPATIENT)
Dept: OBSTETRICS AND GYNECOLOGY | Facility: CLINIC | Age: 23
End: 2023-06-29
Payer: MEDICAID

## 2023-06-29 VITALS
HEART RATE: 90 BPM | DIASTOLIC BLOOD PRESSURE: 76 MMHG | BODY MASS INDEX: 27.36 KG/M2 | SYSTOLIC BLOOD PRESSURE: 118 MMHG | WEIGHT: 149.63 LBS

## 2023-06-29 DIAGNOSIS — O26.849 SIZE OF FETUS INCONSISTENT WITH DATES, ANTEPARTUM: ICD-10-CM

## 2023-06-29 DIAGNOSIS — Z3A.32 32 WEEKS GESTATION OF PREGNANCY: Primary | ICD-10-CM

## 2023-06-29 DIAGNOSIS — O09.30 INSUFFICIENT ANTEPARTUM CARE: ICD-10-CM

## 2023-06-29 DIAGNOSIS — Z64.1 GRAND MULTIPARA: ICD-10-CM

## 2023-06-29 DIAGNOSIS — O09.899 HISTORY OF PRETERM DELIVERY, CURRENTLY PREGNANT: ICD-10-CM

## 2023-06-29 PROBLEM — Z3A.27 27 WEEKS GESTATION OF PREGNANCY: Status: RESOLVED | Noted: 2023-05-26 | Resolved: 2023-06-29

## 2023-06-29 PROCEDURE — 59425 PR ANTEPARTUM CARE ONLY, 4-6 VISITS: ICD-10-PCS | Mod: TH,S$GLB,,

## 2023-06-29 PROCEDURE — 59425 ANTEPARTUM CARE ONLY: CPT | Mod: TH,S$GLB,,

## 2023-06-29 NOTE — PROGRESS NOTES
2023  22 y.o.  at 32w4d Estimated Date of Delivery: 23. EDC per US in MFM office at 9+ 5/7 weeks.  OB History    Para Term  AB Living   6 5 4 1 0 5   SAB IAB Ectopic Multiple Live Births   0 0 0   5      # Outcome Date GA Lbr Deonte/2nd Weight Sex Delivery Anes PTL Lv   6 Current            5  21 29w6d  1.655 kg (3 lb 10.4 oz) M Vag-Spont None N CAROL      Complications:  premature rupture of membranes (PPROM) with unknown onset of labor   4 Term 19    M Vag-Spont None N CAROL   3 Term 18    M Vag-Spont EPI N CAROL   2 Term 17    M Vag-Spont EPI N CAROL   1 Term 12    M Vag-Spont EPI N CAROL       Here for scheduled K[facility]LES[facility] visit. Doing well.  No lof/brvb, dysuria, fever/chills, or abdominal pain. Endorses +FM. Calm, pleasant, NAD. ROS negative with exception of aforementioned:   N&V relieved with zofran and phenergan.    Hx of PTD at 29 weeks. We discussed recommendation for MFM referral and cervical length u/s beginning at 16 weeks. Pt cancelled or no showed all CL ultrasound visits. At 19 weeks during anatomy u/s CL was 5.2 cm. She is on nightly vaginal progesterone 200 mg to prevent PTL. She has been consulted by [facility].      Normal anatomy u/s.     Insufficient prenatal care. Size less than dates 30 cm at 32w4d. Growth u/s ordered.      Tdap at next visit.     BTL consents signed on 2023.    LABS:  A POS/ ABS neg  CBC 13.2/38.8/278  Rubella Immune  Sickle Cell Neg  Hep A B C Neg  HIV Neg  RPR Non-Reactive  UDS Neg  GC/CHL Neg  Urine Cx No Growth  NIPT Low Risk x 3; Male  AFP Neg    GTT 82  CBC at 27 weeks 11.9/35.0/228     Review of Systems:  General ROS: negative for headache or visual changes  Breast ROS: negative for breast lumps  Gastrointestinal ROS: negative for constipation, diarrhea or nausea/vomiting  Musculoskeletal ROS: negative for pain in joints or swelling in face or hands.   Neurological ROS: negative for - headaches, numbness/tingling  or visual changes    OBHX:  Grand Multip   x 5 . Proven to # 7 lbs  PPROM PTL/PTD at 29 weeks with last pregnancy, delivery in [place] (2021).   Desires BTL following delivery. Consents signed on 2023.    Physical Exam:  /76   Pulse 90   Wt 67.9 kg (149 lb 9.6 oz)   LMP 2022 (Exact Date)   BMI 27.36 kg/m²   FHT: Fetal Heart Rate: 150s     Constitutional: She is oriented to person, place, and time. She appears well-developed and well-nourished. No distress.   Pulmonary/Chest: Effort normal. No respiratory distress  Abdominal: Soft, gravid, nontender. No rebound and no guarding.  Fundal Height: Fundal Height (cm): 30 cm S<D  Genitourinary: Deferred   Musculoskeletal: Normal range of motion. Minimal peripheral edema.   Neurological: She is alert and oriented to person, place, and time. Coordination normal. Gait smooth and steady  Skin: Skin is warm and dry. She is not diaphoretic.  Psychiatric: She has a normal mood and affect.      Assessment:   22 y.o., at 32w4d Gestation   Patient Active Problem List   Diagnosis    Grand multipara    History of  delivery, currently pregnant    Nausea and vomiting during pregnancy    32 weeks gestation of pregnancy     Current Outpatient Medications on File Prior to Visit   Medication Sig Dispense Refill    cefdinir (OMNICEF) 300 MG capsule Take 300 mg by mouth 2 (two) times daily.      doxylamine-pyridoxine, vit B6, (DICLEGIS) 10-10 mg TbEC Take 2 tablets by mouth 2 (two) times daily as needed (hyperemesis, nausea and vomiting). (Patient not taking: Reported on 2023) 60 tablet 11    progesterone (PROMETRIUM) 200 MG capsule Place 1 capsule (200 mg total) vaginally nightly. (Patient not taking: Reported on 2023) 30 capsule 5     No current facility-administered medications on file prior to visit.       Plan:  S&S of  PTL/PPROM reinforced.  Continue home meds/daily PNV. RX for Zofran and Phenergan to pharmacy/discussed.  Discussed EDC  change due to US today at 9+5/7 weeks. EDC = 8/20/23.   Hx of PTL/PTD at 29 weeks. Consulted by [facility]. CL at 19 weeks was 5.2 cm. Taking nightly prometrium.   NIPT Low Risk x 3; Male. Neg.  Normal fetal anatomy u/s.  GTT and CBC WNL. Reviewed and discussed with patient.   Size less than dates. Hx of PTL, grand multip, insufficent prenatal care. Growth u/s ordered.   TDAP next visit.   KAMLESH 2 weeks.

## 2023-07-06 ENCOUNTER — PROCEDURE VISIT (OUTPATIENT)
Dept: MATERNAL FETAL MEDICINE | Facility: CLINIC | Age: 23
End: 2023-07-06
Payer: MEDICAID

## 2023-07-06 DIAGNOSIS — O09.30 INSUFFICIENT ANTEPARTUM CARE: ICD-10-CM

## 2023-07-06 DIAGNOSIS — Z3A.32 32 WEEKS GESTATION OF PREGNANCY: ICD-10-CM

## 2023-07-06 DIAGNOSIS — O09.899 HISTORY OF PRETERM DELIVERY, CURRENTLY PREGNANT: ICD-10-CM

## 2023-07-06 DIAGNOSIS — O26.849 SIZE OF FETUS INCONSISTENT WITH DATES, ANTEPARTUM: ICD-10-CM

## 2023-07-06 DIAGNOSIS — Z64.1 GRAND MULTIPARA: ICD-10-CM

## 2023-07-06 PROCEDURE — 76816 US OB/GYN PROCEDURE (VIEWPOINT): ICD-10-PCS | Mod: S$GLB,,, | Performed by: OBSTETRICS & GYNECOLOGY

## 2023-07-06 PROCEDURE — 76816 OB US FOLLOW-UP PER FETUS: CPT | Mod: S$GLB,,, | Performed by: OBSTETRICS & GYNECOLOGY

## 2023-07-13 ENCOUNTER — ROUTINE PRENATAL (OUTPATIENT)
Dept: OBSTETRICS AND GYNECOLOGY | Facility: CLINIC | Age: 23
End: 2023-07-13
Payer: MEDICAID

## 2023-07-13 VITALS — SYSTOLIC BLOOD PRESSURE: 120 MMHG | BODY MASS INDEX: 27.8 KG/M2 | DIASTOLIC BLOOD PRESSURE: 74 MMHG | WEIGHT: 152 LBS

## 2023-07-13 DIAGNOSIS — R11.10 HYPEREMESIS: ICD-10-CM

## 2023-07-13 DIAGNOSIS — Z3A.34 34 WEEKS GESTATION OF PREGNANCY: ICD-10-CM

## 2023-07-13 DIAGNOSIS — O47.00 PRETERM CONTRACTIONS: Primary | ICD-10-CM

## 2023-07-13 DIAGNOSIS — O21.9 NAUSEA AND VOMITING DURING PREGNANCY: ICD-10-CM

## 2023-07-13 PROBLEM — Z3A.32 32 WEEKS GESTATION OF PREGNANCY: Status: RESOLVED | Noted: 2023-06-29 | Resolved: 2023-07-13

## 2023-07-13 PROCEDURE — 90471 IMMUNIZATION ADMIN: CPT | Mod: S$GLB,,, | Performed by: OBSTETRICS & GYNECOLOGY

## 2023-07-13 PROCEDURE — 59426 PR ANTEPARTUM CARE ONLY, >7 VISITS: ICD-10-PCS | Mod: TH,S$GLB,,

## 2023-07-13 PROCEDURE — 87591 N.GONORRHOEAE DNA AMP PROB: CPT

## 2023-07-13 PROCEDURE — 90715 PR TDAP VACCINE >7 YO, IM: ICD-10-PCS | Mod: S$GLB,,, | Performed by: OBSTETRICS & GYNECOLOGY

## 2023-07-13 PROCEDURE — 59426 ANTEPARTUM CARE ONLY: CPT | Mod: TH,S$GLB,,

## 2023-07-13 PROCEDURE — 90715 TDAP VACCINE 7 YRS/> IM: CPT | Mod: S$GLB,,, | Performed by: OBSTETRICS & GYNECOLOGY

## 2023-07-13 PROCEDURE — 90471 PR IMMUNIZ ADMIN,1 SINGLE/COMB VAC/TOXOID: ICD-10-PCS | Mod: S$GLB,,, | Performed by: OBSTETRICS & GYNECOLOGY

## 2023-07-13 PROCEDURE — 87086 URINE CULTURE/COLONY COUNT: CPT

## 2023-07-13 PROCEDURE — 81514 NFCT DS BV&VAGINITIS DNA ALG: CPT

## 2023-07-13 RX ORDER — DOXYLAMINE SUCCINATE AND PYRIDOXINE HYDROCHLORIDE, DELAYED RELEASE TABLETS 10 MG/10 MG 10; 10 MG/1; MG/1
2 TABLET, DELAYED RELEASE ORAL 2 TIMES DAILY PRN
Qty: 60 TABLET | Refills: 11 | Status: SHIPPED | OUTPATIENT
Start: 2023-07-13 | End: 2024-07-12

## 2023-07-13 NOTE — PROGRESS NOTES
2023  22 y.o.  at 34w4d Estimated Date of Delivery: 23. EDC per US in MFM office at 9+ 5/7 weeks.  OB History    Para Term  AB Living   6 5 4 1 0 5   SAB IAB Ectopic Multiple Live Births   0 0 0   5      # Outcome Date GA Lbr Deonte/2nd Weight Sex Delivery Anes PTL Lv   6 Current            5  21 29w6d  1.655 kg (3 lb 10.4 oz) M Vag-Spont None N CAROL      Complications:  premature rupture of membranes (PPROM) with unknown onset of labor   4 Term 19    M Vag-Spont None N CAROL   3 Term 18    M Vag-Spont EPI N CAROL   2 Term 17    M Vag-Spont EPI N CAROL   1 Term 12    M Vag-Spont EPI N CAROL     Se[place] [facility] [place] back pain and contractions. Was given terbutaline and IV fluids to stop contractions. VE per patient 1 cm. Hx of PTD at 29 weeks. Patient denies CTX today. No VB, LOF. + FM. Dicussed ctx likely a result of dehydration. Adequate hydration reviewed. Also discussed infection #1 cause of PTD, recommend urine cx, gc/ct and vaginosis. Patient is agreeable to care plan.     Here for scheduled KAMLES[facility] visit. Doing well.  No lof/brvb, dysuria, fever/chills, or abdominal pain. Endorses +FM. Calm, pleasant, NAD. ROS negative with exception of aforementioned:    Hx of PTD at 29 weeks. We discussed recommendation for MFM referral and cervical length u/s beginning at 16 weeks. Pt cancelled or no showed all CL ultrasound visits. At 19 weeks during anatomy u/s CL was 5.2 cm. She is on nightly vaginal progesterone 200 mg to prevent PTL. She has been consulted by MFM.      Normal anatomy u/s.     Insufficient prenatal care. Size less than dates 30 cm at 32w4d. Growth u/s at 33w4d EFW 4#7oz 16%; AC 16%. Normal interval growth.     Tdap given on 2023.     BTL consents signed on 2023.    GBS and RPR next visit.     LABS:  A POS/ ABS neg  CBC 13.2/38.8/278  Rubella Immune  Sickle Cell Neg  Hep A B C Neg  HIV Neg  RPR Non-Reactive  UDS Neg  GC/CHL  Neg  Urine Cx No Growth  NIPT Low Risk x 3; Male  AFP Neg    GTT 82  CBC at 27 weeks 11.9/35.0/228     GBS and RPR next visit.     Review of Systems:  General ROS: negative for headache or visual changes  Breast ROS: negative for breast lumps  Gastrointestinal ROS: negative for constipation, diarrhea or nausea/vomiting  Musculoskeletal ROS: negative for pain in joints or swelling in face or hands.   Neurological ROS: negative for - headaches, numbness/tingling or visual changes    OBHX:  Grand Multip   x 5 . Proven to # 7 lbs  PPROM PTL/PTD at 29 weeks with last pregnancy, delivery in [place] (2021).   Desires BTL following delivery. Consents signed on 2023.    Physical Exam:  /74   Wt 68.9 kg (152 lb)   LMP 2022 (Exact Date)   BMI 27.80 kg/m²   FHT: Fetal Heart Rate: 150s     Constitutional: She is oriented to person, place, and time. She appears well-developed and well-nourished. No distress.   Pulmonary/Chest: Effort normal. No respiratory distress  Abdominal: Soft, gravid, nontender. No rebound and no guarding.  Fundal Height: Fundal Height (cm): 33 cm S<D  Genitourinary: Deferred   Musculoskeletal: Normal range of motion. Minimal peripheral edema.   Neurological: She is alert and oriented to person, place, and time. Coordination normal. Gait smooth and steady  Skin: Skin is warm and dry. She is not diaphoretic.  Psychiatric: She has a normal mood and affect.      Assessment:   22 y.o., at 34w4d Gestation   Patient Active Problem List   Diagnosis    Grand multipara    History of  delivery, currently pregnant    Nausea and vomiting during pregnancy    34 weeks gestation of pregnancy     Current Outpatient Medications on File Prior to Visit   Medication Sig Dispense Refill    cefdinir (OMNICEF) 300 MG capsule Take 300 mg by mouth 2 (two) times daily.      progesterone (PROMETRIUM) 200 MG capsule Place 1 capsule (200 mg total) vaginally nightly. (Patient not taking: Reported  on 6/29/2023) 30 capsule 5    [DISCONTINUED] doxylamine-pyridoxine, vit B6, (DICLEGIS) 10-10 mg TbEC Take 2 tablets by mouth 2 (two) times daily as needed (hyperemesis, nausea and vomiting). (Patient not taking: Reported on 7/13/2023) 60 tablet 11     No current facility-administered medications on file prior to visit.       Plan:  1. S&S of  PTL/PPROM reinforced.  2. Continue home meds/daily PNV. RX for Zofran and Phenergan to pharmacy/discussed.  3. Discussed EDC change due to US today at 9+5/7 weeks. EDC = 8/20/23.   4. Hx of PTL/PTD at 29 weeks. Consulted by [facility]. CL at 19 weeks was 5.2 cm. Taking nightly prometrium.   5. NIPT Low Risk x 3; Male. Neg.  6. Normal fetal anatomy u/s.  7. GTT and CBC WNL. Reviewed and discussed with patient.   8. Growth u/s at 33w4d EFW 4#7oz 16%; AC 16%. Normal interval growth.   9. TDAP given on 07/13/2023.   10. KAMLESH 2 weeks.

## 2023-07-15 LAB
BACTERIA UR CULT: NORMAL
C TRACH DNA SPEC QL NAA+PROBE: NORMAL
N GONORRHOEA DNA SPEC QL NAA+PROBE: NORMAL

## 2023-07-17 LAB
BACTERIAL VAGINOSIS DNA: NEGATIVE
CANDIDA GLABRATA DNA: NEGATIVE
CANDIDA KRUSEI DNA: NEGATIVE
CANDIDA RRNA VAG QL PROBE: NEGATIVE
T VAGINALIS RRNA GENITAL QL PROBE: NEGATIVE

## 2023-08-01 ENCOUNTER — OUTSIDE PLACE OF SERVICE (OUTPATIENT)
Dept: OBSTETRICS AND GYNECOLOGY | Facility: CLINIC | Age: 23
End: 2023-08-01
Payer: MEDICAID

## 2023-08-01 PROCEDURE — 59409 PR OBSTETRICAL CARE,VAG DELIV ONLY: ICD-10-PCS | Mod: TH,,, | Performed by: STUDENT IN AN ORGANIZED HEALTH CARE EDUCATION/TRAINING PROGRAM

## 2023-08-01 PROCEDURE — 59409 OBSTETRICAL CARE: CPT | Mod: TH,,, | Performed by: STUDENT IN AN ORGANIZED HEALTH CARE EDUCATION/TRAINING PROGRAM
